# Patient Record
Sex: MALE | Race: BLACK OR AFRICAN AMERICAN | NOT HISPANIC OR LATINO | Employment: FULL TIME | URBAN - METROPOLITAN AREA
[De-identification: names, ages, dates, MRNs, and addresses within clinical notes are randomized per-mention and may not be internally consistent; named-entity substitution may affect disease eponyms.]

---

## 2021-04-08 ENCOUNTER — HOSPITAL ENCOUNTER (EMERGENCY)
Facility: MEDICAL CENTER | Age: 41
End: 2021-04-08
Attending: EMERGENCY MEDICINE
Payer: COMMERCIAL

## 2021-04-08 VITALS
HEIGHT: 73 IN | OXYGEN SATURATION: 98 % | BODY MASS INDEX: 19.84 KG/M2 | HEART RATE: 80 BPM | TEMPERATURE: 96.7 F | DIASTOLIC BLOOD PRESSURE: 88 MMHG | SYSTOLIC BLOOD PRESSURE: 133 MMHG | WEIGHT: 149.69 LBS | RESPIRATION RATE: 16 BRPM

## 2021-04-08 DIAGNOSIS — K04.7 DENTAL ABSCESS: ICD-10-CM

## 2021-04-08 PROCEDURE — 99281 EMR DPT VST MAYX REQ PHY/QHP: CPT

## 2021-04-08 RX ORDER — AMOXICILLIN AND CLAVULANATE POTASSIUM 875; 125 MG/1; MG/1
1 TABLET, FILM COATED ORAL 2 TIMES DAILY
Qty: 10 TABLET | Refills: 0 | Status: SHIPPED | OUTPATIENT
Start: 2021-04-08 | End: 2021-04-13

## 2021-04-08 NOTE — ED TRIAGE NOTES
"Chief Complaint   Patient presents with   • Dental Pain   • Facial Swelling     40 yo male ambulatory to triage for above complaint. Pt reports throbbing R Lower dental pain and small amount of facial swelling x 2D, cracked teeth and swelling noted, airway intact. AOx4.    Educated on triage process, encourage to inform staff of any changes.     /99   Pulse 86   Temp 35.9 °C (96.7 °F) (Temporal)   Resp 16   Ht 1.854 m (6' 1\")   Wt 67.9 kg (149 lb 11.1 oz)   SpO2 98%   BMI 19.75 kg/m²   "

## 2021-04-09 NOTE — ED PROVIDER NOTES
"ED Provider Note    CHIEF COMPLAINT(1/4)  Chief Complaint   Patient presents with   • Dental Pain   • Facial Swelling       HPI  Shayne Barry is a 41 y.o. male with no significant past medical history presents for worsening right lower facial swelling for 1 day.  The pain and swelling has been progressively worsening over the past day.  He does however report having a history of dental caries in  Right lower molars that have not been treated or seen by dentist.  He says he came in today because he was concerned that his cavity turned into an abscess.  He does not have any sensitivity with eating and drinking.  He also denies having jaw pain eating. He does have a history of multiple dental caries with fillings, but has not been to the dentist in greater than 10 years.  He denies fevers, chills, chest pain, shortness of breath or recent illness.     REVIEW OF SYSTEMS(1/10)  Pertinent positives include: Right lower face swelling and pain  Pertinent negatives include: Fevers, chills, chest pain or shortness of breath.   All other systems are negative.     PAST MEDICAL HISTORY(PFS1,2)  History reviewed. No pertinent past medical history.    FAMILY HISTORY  History reviewed. No pertinent family history.    SOCIAL HISTORY  Social History     Tobacco Use   • Smoking status: Current Every Day Smoker     Packs/day: 0.25   • Smokeless tobacco: Never Used   Substance Use Topics   • Alcohol use: Never   • Drug use: Never     Social History     Substance and Sexual Activity   Drug Use Never       SURGICAL HISTORY  History reviewed. No pertinent surgical history.    CURRENT MEDICATIONS  Home Medications    **Home medications have not yet been reviewed for this encounter**         ALLERGIES  No Known Allergies    PHYSICAL EXAM  VITAL SIGNS: /88   Pulse 80   Temp 35.9 °C (96.7 °F) (Temporal)   Resp 16   Ht 1.854 m (6' 1\")   Wt 67.9 kg (149 lb 11.1 oz)   SpO2 98%   BMI 19.75 kg/m²    Constitutional: Well " developed, Well nourished  HENT: Normocephalic, atraumatic. Right mandibular swelling, TTP w/o erythema. Poor dentition. Cracked right poster inferior molar. No abscess visualized. Right submandibular LAD palpable.   Eyes: EOMI, conjunctiva pink, no scleral icterus.   Cardiovascular: RRR. S1S2. No murmer.   Respiratory: CTAB.  Skin: No erythema, no rash.   Neurologic: No focal deficit noted.  Psychiatric: Affect normal, Judgment normal, Mood normal.     DIFFERENTIAL DIAGNOSIS:  Facial cellulitis vs Submandibular abscess vs pulpitis     RADIOLOGY/PROCEDURES  No orders to display       LABORATORY: Reviewed as below.  No results found for this or any previous visit.    INTERVENTIONS:  Medications - No data to display  Response: Course of antibiotics- Augmentin     COURSE & MEDICAL DECISION MAKING  41 year old male patient with history of multiple dental caries and poor dental care follow up who presents for worsening swelling and tenderness over right mandible. On examination no external abscess appreciated, and there is no erythema. There is however significant swelling and TTP over right mandible. History and exam consistent with infectious etiology; abscess vs cellulitis vs pulpitis. Absence of erythema and systemic inflammatory signs makes Leudwig or osteomyelitis less likely. Will treat empirically with Augmentin. Discussed with patient plan for antibiotics and importance of having follow up with dentist/oral surgeon for possible extraction and/or I&D.       Review nursing notes and vital signs a final time 5:27 PM    PLAN:  Complete course of Augmentin   Follow up with Dentist/Oral surgeon   Tylenon/Ibuprofen PRN pain control     CONDITION: Stable     FINAL IMPRESSION  1. Dental abscess          Electronically signed by: Carlin Mendoaz D.O., 4/8/2021 5:27 PM

## 2021-12-15 ENCOUNTER — HOSPITAL ENCOUNTER (EMERGENCY)
Facility: MEDICAL CENTER | Age: 41
End: 2021-12-15
Attending: EMERGENCY MEDICINE
Payer: COMMERCIAL

## 2021-12-15 VITALS
HEART RATE: 78 BPM | SYSTOLIC BLOOD PRESSURE: 125 MMHG | DIASTOLIC BLOOD PRESSURE: 67 MMHG | RESPIRATION RATE: 16 BRPM | TEMPERATURE: 97.3 F | OXYGEN SATURATION: 98 % | WEIGHT: 159.83 LBS | BODY MASS INDEX: 21.09 KG/M2

## 2021-12-15 DIAGNOSIS — K04.7 DENTAL INFECTION: ICD-10-CM

## 2021-12-15 PROCEDURE — 99283 EMERGENCY DEPT VISIT LOW MDM: CPT

## 2021-12-15 PROCEDURE — 700102 HCHG RX REV CODE 250 W/ 637 OVERRIDE(OP): Performed by: EMERGENCY MEDICINE

## 2021-12-15 PROCEDURE — A9270 NON-COVERED ITEM OR SERVICE: HCPCS | Performed by: EMERGENCY MEDICINE

## 2021-12-15 RX ORDER — AMOXICILLIN 500 MG/1
500 CAPSULE ORAL 3 TIMES DAILY
Qty: 21 CAPSULE | Refills: 0 | Status: SHIPPED | OUTPATIENT
Start: 2021-12-15 | End: 2021-12-22

## 2021-12-15 RX ORDER — AMOXICILLIN 500 MG/1
1000 CAPSULE ORAL ONCE
Status: COMPLETED | OUTPATIENT
Start: 2021-12-15 | End: 2021-12-15

## 2021-12-15 RX ADMIN — AMOXICILLIN 1000 MG: 500 CAPSULE ORAL at 19:21

## 2021-12-15 ASSESSMENT — LIFESTYLE VARIABLES
TOTAL SCORE: 0
HAVE PEOPLE ANNOYED YOU BY CRITICIZING YOUR DRINKING: NO
EVER FELT BAD OR GUILTY ABOUT YOUR DRINKING: NO
TOTAL SCORE: 0
EVER HAD A DRINK FIRST THING IN THE MORNING TO STEADY YOUR NERVES TO GET RID OF A HANGOVER: NO
HAVE YOU EVER FELT YOU SHOULD CUT DOWN ON YOUR DRINKING: NO
DOES PATIENT WANT TO STOP DRINKING: NO
CONSUMPTION TOTAL: INCOMPLETE
TOTAL SCORE: 0
DO YOU DRINK ALCOHOL: NO

## 2021-12-16 NOTE — ED PROVIDER NOTES
ED Provider Note    CHIEF COMPLAINT  Chief Complaint   Patient presents with   • Dental Pain       HPI  Shayne Barry is a 41 y.o. male who presents with a chief complaint of dental pain.  Right upper premolar and molar.  Duration for 20 hours.  Constant.  Slightly alleviated with Tylenol.  No significant swelling.  No associated discharge no difficulty swallowing and no fever associated.  It is fairly constant    He had history of dental infection in the past he can read the note it appears patient had a facial infection.    Patient also recently is going to get insurance January 1.  Therefore he will get dental insurance and able to see a dentist at that time.  He has no dental axis at this time    REVIEW OF SYSTEMS  General: No fever chills  Ear nose throat: See above pulmonary: No difficulty breathing    PAST MEDICAL HISTORY  No past medical history on file.    FAMILY HISTORY  No family history on file.    SOCIAL HISTORY  Social History     Socioeconomic History   • Marital status: Unknown     Spouse name: Not on file   • Number of children: Not on file   • Years of education: Not on file   • Highest education level: Not on file   Occupational History   • Not on file   Tobacco Use   • Smoking status: Current Every Day Smoker     Packs/day: 0.25   • Smokeless tobacco: Never Used   Substance and Sexual Activity   • Alcohol use: Never   • Drug use: Never   • Sexual activity: Not on file   Other Topics Concern   • Not on file   Social History Narrative   • Not on file     Social Determinants of Health     Financial Resource Strain:    • Difficulty of Paying Living Expenses: Not on file   Food Insecurity:    • Worried About Running Out of Food in the Last Year: Not on file   • Ran Out of Food in the Last Year: Not on file   Transportation Needs:    • Lack of Transportation (Medical): Not on file   • Lack of Transportation (Non-Medical): Not on file   Physical Activity:    • Days of Exercise per Week: Not on file    • Minutes of Exercise per Session: Not on file   Stress:    • Feeling of Stress : Not on file   Social Connections:    • Frequency of Communication with Friends and Family: Not on file   • Frequency of Social Gatherings with Friends and Family: Not on file   • Attends Jew Services: Not on file   • Active Member of Clubs or Organizations: Not on file   • Attends Club or Organization Meetings: Not on file   • Marital Status: Not on file   Intimate Partner Violence:    • Fear of Current or Ex-Partner: Not on file   • Emotionally Abused: Not on file   • Physically Abused: Not on file   • Sexually Abused: Not on file   Housing Stability:    • Unable to Pay for Housing in the Last Year: Not on file   • Number of Places Lived in the Last Year: Not on file   • Unstable Housing in the Last Year: Not on file       SURGICAL HISTORY  No past surgical history on file.    CURRENT MEDICATIONS  Home Medications     Reviewed by Vivien Childers R.N. (Registered Nurse) on 12/15/21 at 1804  Med List Status: Complete   Medication Last Dose Status        Patient Shan Taking any Medications                       ALLERGIES  No Known Allergies    PHYSICAL EXAM  VITAL SIGNS: /91   Pulse 81   Temp 36.2 °C (97.2 °F) (Temporal)   Resp 16   Wt 72.5 kg (159 lb 13.3 oz)   SpO2 100%   BMI 21.09 kg/m²    Constitutional: Well developed, Well nourished, no acute distress,   HENT: Normocephalic, Atraumatic, Oropharynx moist, No oral exudates, Nose normal.  Patient has some dental caries.  The right upper premolar there is some sinc gingival swelling.  No discharge no fluctuance no mass tenderness over that area premolar also has necrotic tooth with dental caries.  No trismus.  No peritonsillar swelling or uvula mass.  Eyes: PERRLA, EOMI, Conjunctiva normal, No discharge.   Musculoskeletal: Neck normal range of motion, No tenderness, Supple,  Thorax & Lungs: No stridor or respiratory distress        RADIOLOGY/PROCEDURES  1 g of  amoxicillin given    COURSE & MEDICAL DECISION MAKING  Pertinent Labs & Imaging studies reviewed. (See chart for details)  Very pleasant gentleman 41 years old history of dental infection in the past with the beginning of gingivitis, possible early dental infection and minimal facial cellulitis.  Airway issues no sepsis therefore the patient be treated with oral outpatient antibiotics    Dental infection  See above  Treatment see note    Social terms of health lack of dental insurance  Patient was starting dental insurance and January  Anticipatory guidance and instructions on calling dental surgeon now to get it to see if they would accept his insurance we get seen in early January    Dental issues  Gave patient counseling regarding what a dentist does and what dental surgeon does in addition his options are to include cosmetic surgery versus dental extraction he chooses dental extraction which I reinforced is the best plan    FINAL IMPRESSION  1.  Dental caries  2.  Possible early facial cellulitis  3.  Lack of dental insurance      Electronically signed by: Felix Ortiz M.D., 12/15/2021 7:18 PM

## 2021-12-16 NOTE — ED TRIAGE NOTES
Pt comes in complaining of R upper and lower dental pain. Pt stating he has an appt in January with a dentist, asking for other referrals. Pt stating swelling and pain increased significantly over the past 24 hours.

## 2021-12-16 NOTE — DISCHARGE INSTRUCTIONS
Please make sure you see the dentist for the cosmetic surgery on the front teeth  I recommend seeing an oral surgeon, and call them as soon as you can to get seen in January.  Please make sure they accept your dental insurance before making the appointment  Come back if your swelling looks worse or pain looks worse.

## 2021-12-16 NOTE — ED NOTES
Discharge instructions discussed with pt, verbalizes understanding. All belongings with pt upon leaving unit. Pt amb to lobby with steady gait.